# Patient Record
Sex: MALE | Race: AMERICAN INDIAN OR ALASKA NATIVE | HISPANIC OR LATINO
[De-identification: names, ages, dates, MRNs, and addresses within clinical notes are randomized per-mention and may not be internally consistent; named-entity substitution may affect disease eponyms.]

---

## 2023-05-08 PROBLEM — Z00.00 ENCOUNTER FOR PREVENTIVE HEALTH EXAMINATION: Status: ACTIVE | Noted: 2023-05-08

## 2023-05-10 VITALS — WEIGHT: 175 LBS | HEIGHT: 71 IN | BODY MASS INDEX: 24.5 KG/M2

## 2023-05-11 ENCOUNTER — APPOINTMENT (OUTPATIENT)
Dept: UROLOGY | Facility: CLINIC | Age: 68
End: 2023-05-11
Payer: MEDICARE

## 2023-05-11 DIAGNOSIS — Z87.891 PERSONAL HISTORY OF NICOTINE DEPENDENCE: ICD-10-CM

## 2023-05-11 DIAGNOSIS — Z80.3 FAMILY HISTORY OF MALIGNANT NEOPLASM OF BREAST: ICD-10-CM

## 2023-05-11 DIAGNOSIS — R97.20 ELEVATED PROSTATE, SPECIFIC ANTIGEN [PSA]: ICD-10-CM

## 2023-05-11 DIAGNOSIS — Z86.39 PERSONAL HISTORY OF OTHER ENDOCRINE, NUTRITIONAL AND METABOLIC DISEASE: ICD-10-CM

## 2023-05-11 DIAGNOSIS — Z78.9 OTHER SPECIFIED HEALTH STATUS: ICD-10-CM

## 2023-05-11 DIAGNOSIS — Z86.79 PERSONAL HISTORY OF OTHER DISEASES OF THE CIRCULATORY SYSTEM: ICD-10-CM

## 2023-05-11 PROCEDURE — 99443: CPT

## 2023-05-11 RX ORDER — LISINOPRIL 30 MG/1
TABLET ORAL
Refills: 0 | Status: ACTIVE | COMMUNITY

## 2023-05-11 RX ORDER — METFORMIN HYDROCHLORIDE 500 MG/1
500 TABLET, COATED ORAL
Refills: 0 | Status: ACTIVE | COMMUNITY

## 2023-05-11 RX ORDER — SITAGLIPTIN 100 MG/1
TABLET, FILM COATED ORAL
Refills: 0 | Status: ACTIVE | COMMUNITY

## 2023-05-11 RX ORDER — LOSARTAN POTASSIUM 50 MG/1
50 TABLET, FILM COATED ORAL
Refills: 0 | Status: ACTIVE | COMMUNITY

## 2023-05-11 RX ORDER — SIMVASTATIN 20 MG/1
20 TABLET, FILM COATED ORAL
Refills: 0 | Status: ACTIVE | COMMUNITY

## 2023-05-11 RX ORDER — CALCIUM CARB/VITAMIN D3/VIT K1 500-100-40
TABLET,CHEWABLE ORAL
Refills: 0 | Status: ACTIVE | COMMUNITY

## 2023-05-11 NOTE — HISTORY OF PRESENT ILLNESS
[Other Location: e.g. School (Enter Location, City,State)___] : at [unfilled], at the time of the visit. [FreeTextEntry1] : Dear Dr. Truman Bagley\par \par Thank you so much for the referral to help care for your patient. \par \par Chief Complaint: Elevated PSA\par Date of first visit: 5/11/2023 \par  ID # [id number]\par \par TOMY MORGAN is a 67 year old  man with PMHx HTN and DM who presents for elevated PSA. His PSA is 6.3 ng/ml. MRI on 4/20/23 demonstrated a PIRADS 4 lesion right apex. His PSA density is elevated (0.15 ng/ml/cc). He is biopsy naive and denies family hx of  malignancies. His select MDx resulted as 30% risk of detecting HG disease.\par \par 4/5/23 Select MDx 58% risk of detecting CaP on bx/30% risk of detecting HG disease\par \par Patient denies having urolift or BPH procedure done as indicated in referring provider's progress note. He has no urinary complaints. Denies weak flow, incomplete emptying, dysuria or hematuria.\par \par PSA Hx \par 6.30 ng/ml on 03/24/2023\par \par MRI Hx \par MRI at [facility] 04/20/2023. Volume 42 cc prostate with PIRADS 4 lesion to the posterior right prostate gland between mid and apex level measuring 0.5 X 0.3 x 0.4 cm. No LAD No EPE, No Bony Lesions. The clinical implications have been discussed with the patient. \par \par 05/11/2023 \par IPSS 2 QOL 4 -- he states he didn't mean to put 4. he has no complaints\par IIEF 15\par \par Prostate cancer screening: The patient and I spoke at length about prostate cancer screening, its risks and its benefits. The patient has 2 (age, PSA) risk factors for prostate cancer. He understands that many men with prostate cancer will die with the disease rather than of it and we also discussed the results large multi-center American and  prostate cancer screening trials. He also understands that PSA in and of itself does not diagnose prostate cancer but only assesses risk to a certain degree. The patient understands that to definitively screen for prostate cancer, a biopsy is required, and this procedure has risks, including bleeding, infection, ED and urinary retention. The patient opted to fusion biopsy. \par \par The patient denies fevers, chills, nausea and or vomiting and no unexplained weight loss. \par \par All pertinent laboratory, films and physician notes were reviewed. Questionnaire results were discussed with patient.\par

## 2023-05-11 NOTE — REASON FOR VISIT
[Initial Visit ___] : [unfilled] is here today for an initial visit  for [unfilled] [This encounter was initiated by telehealth (audio with video) and converted to telephone (audio only) due to technical difficulties.] : This encounter was initiated by telehealth (audio with video) and converted to telephone (audio only) due to technical difficulties. [Home] : at home, [unfilled] , at the time of the visit. [Medical Office: ([facility])___] : at the medical office located in  [Patient] : the patient

## 2023-05-11 NOTE — ASSESSMENT
[FreeTextEntry1] : 66 yo male with elevated PSA 6.3 ng/ml, MRI with PIRADS 4 lesion right apex, elevated PSAD 0.15 ng/ml/cc, biopsy naive, neg FH. Elevated select MDx (30% risk of HG disease).\par \par 1. Book for biopsy with Dr Solares\par 2. Images in [facility]stream\par \par He understands that many men with prostate cancer will die with the disease rather than of it and we also discussed the results large multi-center American and  prostate cancer screening trials. He also understands that PSA in and of itself does not diagnose prostate cancer but only assesses risk to a certain degree. The patient understands that to definitively screen for prostate cancer, a biopsy is required and this procedure has risks, including bleeding, infection, ED and urinary retention. The patient opted to move forward with the biopsy.\par \par The patient is aware to expect hematuria x 2 weeks and upto 4 weeks of hematospermia.  There is a risk of infection albeit much lower than a transrectal approach. In some cases patients can experience erectile dysfunction but this is usually self limiting.  Any fever/chills after the biopsy the patient is to contact the office and go to the ER for an immediate evaluation. He has been given paper instructions outlining these items - which includes medications to avoid prior to surgery.\par \par 1. CBC, BMP, PSA, UA UCx. EKG\par 2. Medical Clearance\par 3. TP biopsy at [facility]\par 4. follow up 2 weeks after biopsy with his primary urologist or ourselves.\par 5. we will call with the path results once they are resulted.\par \par Follow up with Dr Solares in office for ARNULFO/MRI review\par Biopsy with Dr Solares\par Postop follow up with Dr Solares\par \par \par \par Susan Aviles, NILA, was in the office during the entirety of this telemedicine visit.

## 2023-05-20 ENCOUNTER — APPOINTMENT (OUTPATIENT)
Dept: UROLOGY | Facility: CLINIC | Age: 68
End: 2023-05-20

## 2023-05-26 ENCOUNTER — OUTPATIENT (OUTPATIENT)
Dept: OUTPATIENT SERVICES | Facility: HOSPITAL | Age: 68
LOS: 1 days | End: 2023-05-26
Payer: SELF-PAY

## 2023-05-26 DIAGNOSIS — R97.20 ELEVATED PROSTATE SPECIFIC ANTIGEN [PSA]: ICD-10-CM

## 2023-05-26 PROCEDURE — 76377 3D RENDER W/INTRP POSTPROCES: CPT | Mod: 26

## 2023-05-26 PROCEDURE — C8001: CPT

## 2023-06-22 ENCOUNTER — APPOINTMENT (OUTPATIENT)
Dept: UROLOGY | Facility: CLINIC | Age: 68
End: 2023-06-22
Payer: MEDICARE

## 2023-06-22 ENCOUNTER — APPOINTMENT (OUTPATIENT)
Dept: UROLOGY | Facility: CLINIC | Age: 68
End: 2023-06-22

## 2023-06-22 VITALS
SYSTOLIC BLOOD PRESSURE: 128 MMHG | HEART RATE: 77 BPM | TEMPERATURE: 97.3 F | DIASTOLIC BLOOD PRESSURE: 82 MMHG | OXYGEN SATURATION: 96 %

## 2023-06-22 PROCEDURE — 99214 OFFICE O/P EST MOD 30 MIN: CPT

## 2023-06-26 LAB — BACTERIA UR CULT: NORMAL

## 2023-07-11 NOTE — ASU PATIENT PROFILE, ADULT - NS PREOP UNDERSTANDS INFO
No solid food/dairy/candy/gum after 10:45pm tonight, water allowed before 05:30am tomorrow; patient reminded to come with photo ID/insurance/credit card; dress in comfortable clothes; no jewelries/valuables/contacts, no smoking/drinking alcohol/recreational drug use today, escort to have a photo, address and callback number was given/yes

## 2023-07-11 NOTE — ASU PATIENT PROFILE, ADULT - NS PREOP MEDICATION GIVEN
05/25/17 1445   Discharge Assessment   Assessment Type Discharge Planning Assessment   Confirmed/corrected address and phone number on facesheet? Yes   Assessment information obtained from? Patient;Other  (daughter)   Expected Length of Stay (days) 3   Communicated expected length of stay with patient/caregiver yes   Prior to hospitilization cognitive status: Alert/Oriented   Prior to hospitalization functional status: Independent;Assistive Equipment   Current cognitive status: Alert/Oriented   Current Functional Status: Assistive Equipment;Needs Assistance   Arrived From admitted as an inpatient   Lives With alone   Able to Return to Prior Arrangements unable to determine at this time (comments)   Is patient able to care for self after discharge? Unable to determine at this time (comments)   How many people do you have in your home that can help with your care after discharge? 1   Who are your caregiver(s) and their phone number(s)? (Samson Canas, daughter, [phone])   Patient's perception of discharge disposition admitted as an inpatient   Readmission Within The Last 30 Days current reason for admission unrelated to previous admission   Patient currently being followed by outpatient case management? No   Patient currently receives home health services? Yes  ([facility]/p [facility] last hospitalization)   Patient previously received home health services and would like to resume services if necessary? Yes   If yes, name of home health provider: ([facility] for PT of R shoulder)   Does the patient currently use HME? Yes   Patient currently receives private duty nursing? N/A   Patient currently receives any other outside agency services? No   Equipment Currently Used at Home cane, straight;rollator   Do you have any problems affording any of your prescribed medications? No   Is the patient taking medications as prescribed? yes   Do you have any financial concerns preventing you from  receiving the healthcare you need? No   Does the patient have transportation to healthcare appointments? Yes   Transportation Available family or friend will provide   On Dialysis? No   Does the patient receive services at the [facility]mad[facility] Clinic? No   Are there any open cases? No   Discharge Plan A Home;Home Health   Discharge Plan B Skilled Nursing Facility   Patient/Family In Agreement With Plan yes     Javan Castillo MD  [address] [place] [address] [address] / [place] [id number]      Nargis COBOS (prev. TLCRx) - JONATHON Perez - [address] [facility]  [address] [facility]  [address]  [place] [address]  Phone: [phone] Fax: [phone]      Payor: [employer] MEDICARE / Plan: HU[employer] MEDICARE HMO / Product Type: Capitation /      yes

## 2023-07-12 ENCOUNTER — TRANSCRIPTION ENCOUNTER (OUTPATIENT)
Age: 68
End: 2023-07-12

## 2023-07-12 ENCOUNTER — APPOINTMENT (OUTPATIENT)
Dept: UROLOGY | Facility: AMBULATORY SURGERY CENTER | Age: 68
End: 2023-07-12

## 2023-07-12 ENCOUNTER — RESULT REVIEW (OUTPATIENT)
Age: 68
End: 2023-07-12

## 2023-07-12 ENCOUNTER — OUTPATIENT (OUTPATIENT)
Dept: OUTPATIENT SERVICES | Facility: HOSPITAL | Age: 68
LOS: 1 days | Discharge: HOME | End: 2023-07-12
Payer: MEDICARE

## 2023-07-12 VITALS
DIASTOLIC BLOOD PRESSURE: 59 MMHG | HEART RATE: 65 BPM | OXYGEN SATURATION: 98 % | RESPIRATION RATE: 16 BRPM | HEIGHT: 71 IN | TEMPERATURE: 97 F | WEIGHT: 171.96 LBS | SYSTOLIC BLOOD PRESSURE: 102 MMHG

## 2023-07-12 VITALS
DIASTOLIC BLOOD PRESSURE: 77 MMHG | RESPIRATION RATE: 16 BRPM | SYSTOLIC BLOOD PRESSURE: 155 MMHG | HEART RATE: 69 BPM | TEMPERATURE: 97 F | OXYGEN SATURATION: 98 %

## 2023-07-12 DIAGNOSIS — Z98.890 OTHER SPECIFIED POSTPROCEDURAL STATES: Chronic | ICD-10-CM

## 2023-07-12 LAB
GLUCOSE BLDC GLUCOMTR-MCNC: 166 MG/DL — HIGH (ref 70–99)
GLUCOSE BLDC GLUCOMTR-MCNC: 211 MG/DL — HIGH (ref 70–99)

## 2023-07-12 PROCEDURE — 76377 3D RENDER W/INTRP POSTPROCES: CPT | Mod: 26

## 2023-07-12 PROCEDURE — 55706 BX PRST8 NDL SAT SAMPLING: CPT

## 2023-07-12 PROCEDURE — G0416: CPT | Mod: 26

## 2023-07-12 RX ORDER — SIMVASTATIN 20 MG/1
1 TABLET, FILM COATED ORAL
Refills: 0 | DISCHARGE

## 2023-07-12 RX ORDER — TAMSULOSIN HYDROCHLORIDE 0.4 MG/1
1 CAPSULE ORAL
Refills: 0 | DISCHARGE

## 2023-07-12 RX ORDER — GABAPENTIN 400 MG/1
0 CAPSULE ORAL
Refills: 0 | DISCHARGE

## 2023-07-12 RX ORDER — AMLODIPINE BESYLATE 2.5 MG/1
1 TABLET ORAL
Refills: 0 | DISCHARGE

## 2023-07-12 RX ORDER — LOSARTAN POTASSIUM 100 MG/1
1 TABLET, FILM COATED ORAL
Refills: 0 | DISCHARGE

## 2023-07-12 RX ORDER — ONDANSETRON 8 MG/1
4 TABLET, FILM COATED ORAL ONCE
Refills: 0 | Status: DISCONTINUED | OUTPATIENT
Start: 2023-07-12 | End: 2023-07-12

## 2023-07-12 RX ORDER — SODIUM CHLORIDE 9 MG/ML
500 INJECTION, SOLUTION INTRAVENOUS
Refills: 0 | Status: DISCONTINUED | OUTPATIENT
Start: 2023-07-12 | End: 2023-07-12

## 2023-07-12 RX ORDER — BRIMONIDINE TARTRATE, TIMOLOL MALEATE 2; 5 MG/ML; MG/ML
1 SOLUTION/ DROPS OPHTHALMIC
Refills: 0 | DISCHARGE

## 2023-07-12 RX ORDER — ASPIRIN/CALCIUM CARB/MAGNESIUM 324 MG
1 TABLET ORAL
Refills: 0 | DISCHARGE

## 2023-07-12 RX ORDER — FENTANYL CITRATE 50 UG/ML
25 INJECTION INTRAVENOUS
Refills: 0 | Status: DISCONTINUED | OUTPATIENT
Start: 2023-07-12 | End: 2023-07-12

## 2023-07-12 RX ORDER — INSULIN ASPART 100 [IU]/ML
0 INJECTION, SOLUTION SUBCUTANEOUS
Refills: 0 | DISCHARGE

## 2023-07-12 RX ADMIN — SODIUM CHLORIDE 100 MILLILITER(S): 9 INJECTION, SOLUTION INTRAVENOUS at 10:57

## 2023-07-12 NOTE — ASU DISCHARGE PLAN (ADULT/PEDIATRIC) - CARE PROVIDER_API CALL
Sabrina Solares   Urology  [address], [place] [address] [place] [address]  Phone: [phone]  Fax: [phone]  Follow Up Time:

## 2023-07-12 NOTE — PRE-ANESTHESIA EVALUATION ADULT - NSANTHPEFT_GEN_ALL_CORE
Abdomen , obese soft, nontender, nondistended , no guarding or rigidity , no masses palpable , normal bowel sounds , Liver and Spleen,  no hepatosplenomegaly , liver nontender
lungs are clear  cor- s1s2 no murmurs

## 2023-07-13 ENCOUNTER — NON-APPOINTMENT (OUTPATIENT)
Age: 68
End: 2023-07-13

## 2023-07-13 PROBLEM — I10 ESSENTIAL (PRIMARY) HYPERTENSION: Chronic | Status: ACTIVE | Noted: 2023-07-12

## 2023-07-13 PROBLEM — E78.00 PURE HYPERCHOLESTEROLEMIA, UNSPECIFIED: Chronic | Status: ACTIVE | Noted: 2023-07-12

## 2023-07-13 PROBLEM — E11.9 TYPE 2 DIABETES MELLITUS WITHOUT COMPLICATIONS: Chronic | Status: ACTIVE | Noted: 2023-07-12

## 2023-07-13 PROBLEM — I70.209 UNSPECIFIED ATHEROSCLEROSIS OF NATIVE ARTERIES OF EXTREMITIES, UNSPECIFIED EXTREMITY: Chronic | Status: ACTIVE | Noted: 2023-07-12

## 2023-07-13 PROBLEM — D64.9 ANEMIA, UNSPECIFIED: Chronic | Status: ACTIVE | Noted: 2023-07-12

## 2023-07-18 LAB — SURGICAL PATHOLOGY STUDY: SIGNIFICANT CHANGE UP

## 2023-07-18 NOTE — ADDENDUM
[FreeTextEntry1] : 6/22/2023: Urine culture negative\par \par 7/12/2023: Prostate biopsy\par Final Diagnosis\par \par 1. Prostate, left anterior apex, needle biopsy\par -Benign prostate tissue.\par \par 2. Prostate, left anterior base, needle biopsy\par -Benign fibromuscular tissue.\par \par 3. Prostate, right anterior apex, needle biopsy\par -Benign prostate tissue.\par \par 4. Prostate, right anterior base, needle biopsy\par -Adenocarcinoma of the prostate, Prognostic Grade Group 1 ([place]\par score 3+3=6) involving 10% (1 mm in length) of 1 core.\par \par 5. Prostate, midline apex, needle biopsy\par -Benign prostate tissue.\par \par 6. Prostate, midline base, needle biopsy\par -Benign prostate tissue.\par \par 7. Prostate, left posterior apex, needle biopsy\par -Adenocarcinoma of the prostate, Prognostic Grade Group 1 (Michael\par score 3+3=6) involving 5% (<1 mm in length) of 1 core.\par \par 8. Prostate, left posterior base, needle biopsy\par -Benign prostate tissue.\par \par 9. Prostate, right posterior apex, needle biopsy\par -High grade prostatic intraepithelial neoplasia (HGPIN).\par \par 10. Prostate, right posterior base, needle biopsy\par -Benign prostate tissue.\par \par 11. Prostate, left lateral, needle biopsy\par -High grade prostatic intraepithelial neoplasia (HGPIN). Small focus\par of mildly atypical glands also noted.\par \par 12. Prostate, right lateral, needle biopsy\par -Benign prostate tissue.\par \par 13. Prostate, right PZPA, needle biopsy\par -Adenocarcinoma of the prostate, Prognostic Grade Group 1 ([place]\par score 3+3=6) involving 40% (4 mm in length) of 1 of 6 cores. Perineural\par invasion is identified. Separate prostate tissue with small focus of\par atypical glands, suspicious for carcinoma.

## 2023-07-18 NOTE — HISTORY OF PRESENT ILLNESS
[FreeTextEntry1] : Language: English/Vincentian\par Date of First visit: May 2023\par Accompanied by: Self\par Contact info: ***\par Referring Provider/PCP: Dr. Bagley\par \par \par \par CC/ Problem List:\par \par ===============================================================================\par FIRST VISIT:\par The patient was a 67 year male who first presented on  05/11/2023 for elevated PSA. He was supposed to  come in in May 2023 but he went to [place] instead.\par \par TOMY MORGAN is a 67 year old  man with PMHx HTN and DM who presents for elevated PSA. His PSA is 6.3 ng/ml. MRI on 4/20/23 demonstrated a PIRADS 4 lesion right apex. His PSA density is elevated (0.15 ng/ml/cc).  His select MDx resulted as 30% risk of detecting HG disease.\par \par 4/5/23 Select MDx 58% risk of detecting CaP on bx/30% risk of detecting HG disease\par \par The patient has no h/o Urolift or BPH procedure done as indicated in referring provider's progress note. He denies weak flow, incomplete emptying, dysuria or hematuria. He states that he has some frequency, occasional dysuria, and sometimes a weak stream. He denies FH of prostate issues. He has never had a prostate biopsy\par \par 05/11/2023 \par IPSS 2 QOL 4 -- he states he didn't mean to put 4. he has no complaints\par IIEF 15\par \par -------------------------------------------------------------------------------------------\par INTERVAL VISITS:\par \par \par ===============================================================================\par \par PMH:  DM, HTN\par PSH: Denies\par POBH: (if applicable)\par FH: \par \par ALL: Denies\par MEDS: DM meds, no HTN, ASA 81mg, no blood thinners\par SOC: He quit smoking around age 32yo, He used to drink but stopped in his early 60's, no Drugs\par \par \par ROS: Review of Systems is as per HPI unless otherwise denoted below\par \par \par ===============================================================================\par DATA: \par \par LABS:-------------------------------------------------------------------------------------------------------------------\par PSA Hx \par 6.30 ng/ml on 03/24/2023\par \par \par RADS:-------------------------------------------------------------------------------------------------------------------\par MRI Hx \par MRI at R 04/20/2023. Volume 42 cc prostate with PIRADS 4 lesion to the posterior right prostate gland between mid and apex level measuring 0.5 X 0.3 x 0.4 cm. No LAD No EPE, No Bony Lesions. \par \par \par PATHOLOGY/CYTOLOGY:-------------------------------------------------------------------------------------------\par \par \par \par VOIDING STUDIES: ----------------------------------------------------------------------------------------------------\par \par \par \par STONE STUDIES: (Analysis/LLSA)----------------------------------------------------------------------------------\par \par \par \par PROCEDURES: -----------------------------------------------------------------------------------------------\par \par \par \par \par ===============================================================================\par \par PHYSICAL EXAM:\par \par GEN: AAOx3, NAD; Habitus: normal\par \par BARRIERS to CARE: some language\par \par PSYCH: Appropriate Behavior, Affect Congruent\par \par HEENT: AT/NC Trachea midline. EOMI.\par \par Lungs: No labored breathing.\par \par NEURO: + Movement, all 4 extremities grossly intact without deficits. No tremors.\par \par SKIN: Warm dry. No visible rashes or ulcers\par \par GAIT: Gait normal, Stability good\par \par \par  FOCUSED: -------------------------------------------------------------------------------------------------\par \par In compliance with [facility] policy the patient was offered a chaperone during this exam\par The patient DID accept a chaperone\par The person present for this entire exam/procedure as chaperone was: Torrey Torres MA\par \par Prostate: (date 6/22/2023) 60 grams. Smooth. No nodules. Symmetric. No tenderness, No Fluctuance.\par ARNULFO: No hemorrhoids. Normal tone.\par =======================================================================================\par \par \par \par \par ASSESSMENT and PLAN\par \par The patient is a 67 year male with a history of the following:\par \par 1. Elevated PSA, PIRADs 4 lesion, no FH prostate cancer\par \par Because of the patient's clinical situation we decided to set the patient up for a Transperineal Fusion prostate biopsy. This is done in the operating room under anesthesia.\par \par The patient understands that the risks of this procedure include bleeding (hematuria, hematospermia, blood in stool or per rectum), infection, difficulty voiding/inability to urinate, fever, and, with repeated biopsies, problems with erections.  He understands that if he can not void after the biopsy he will go home with a reaves. \par \par He was given and understands the biopsy prep:\par \par a. Fleet's enema on the morning of your biopsy.\par \par b. Avoid blood thinners, fish oil and supplemental Vitamin E. He can stay on baby ASA if he takes it.\par \par c. He was given information regarding blood thinners if he is on them. \par \par The above was explained in Ukrainian and the patient stated he understood the above and signed consent. \par \par \par -----------------------------------------------------------------------------------------------------\par LABS/TESTS Ordered: UCx\par Meds Ordered:\par Follow up: Biopsy\par -----------------------------------------------------------------------------------------------------\par \par The total amount of time I have personally spent preparing for this visit, reviewing the patient's test results, obtaining external history, ordering tests/medications, documenting clinical information, communicating with and counseling the patient/family and/or caregiver(s), and spent face to face with the patient explaining the above was  35 minutes.\par \par The patient has the following barriers to care / social determinants of health which made this visit substantially longer than normal:\par \par Thank you for allowing me to assist in the care of your patient. Should you have any questions please do not hesitate to reach out to me.\par \par \par Sabrina Solares MD\[facility] Associate \[facility] Department of Urology\[facility] Phone: [phone]\[facility] Fax: [phone]\[facility] \[facility] [address]\[facility] [address]\[facility]

## 2023-07-19 NOTE — ASU DISCHARGE PLAN (ADULT/PEDIATRIC) - DO NOT DRIVE IF TAKING PAIN MEDICATION
Routed to Marcos  Please review CT scan from 7/18 in chart  Pt is currently scheduled with you on 8/11    Repeat US from CT scan is scheduled for 8/29   NULL

## 2023-07-25 ENCOUNTER — APPOINTMENT (OUTPATIENT)
Dept: UROLOGY | Facility: CLINIC | Age: 68
End: 2023-07-25
Payer: MEDICARE

## 2023-07-25 VITALS
HEART RATE: 71 BPM | TEMPERATURE: 97.5 F | DIASTOLIC BLOOD PRESSURE: 64 MMHG | SYSTOLIC BLOOD PRESSURE: 103 MMHG | OXYGEN SATURATION: 97 %

## 2023-07-25 PROCEDURE — 99213 OFFICE O/P EST LOW 20 MIN: CPT

## 2023-08-19 NOTE — ADDENDUM
[FreeTextEntry1] : 7/12/2023: Prostate biopsy 1. Prostate, left anterior apex, needle biopsy -Benign prostate tissue.  2. Prostate, left anterior base, needle biopsy -Benign fibromuscular tissue.  3. Prostate, right anterior apex, needle biopsy -Benign prostate tissue.  4. Prostate, right anterior base, needle biopsy -Adenocarcinoma of the prostate, Prognostic Grade Group 1 (Michael score 3+3=6) involving 10% (1 mm in length) of 1 core.  5. Prostate, midline apex, needle biopsy -Benign prostate tissue.  6. Prostate, midline base, needle biopsy -Benign prostate tissue.  7. Prostate, left posterior apex, needle biopsy -Adenocarcinoma of the prostate, Prognostic Grade Group 1 ([place] score 3+3=6) involving 5% (<1 mm in length) of 1 core.  8. Prostate, left posterior base, needle biopsy -Benign prostate tissue.  9. Prostate, right posterior apex, needle biopsy -High grade prostatic intraepithelial neoplasia (HGPIN).  10. Prostate, right posterior base, needle biopsy -Benign prostate tissue.  11. Prostate, left lateral, needle biopsy -High grade prostatic intraepithelial neoplasia (HGPIN). Small focus of mildly atypical glands also noted.  12. Prostate, right lateral, needle biopsy -Benign prostate tissue.  13. Prostate, right PZPA, needle biopsy -Adenocarcinoma of the prostate, Prognostic Grade Group 1 ([place] score 3+3=6) involving 40% (4 mm in length) of 1 of 6 cores. Perineural invasion is identified. Separate prostate tissue with small focus of atypical glands, suspicious for carcinoma.

## 2023-08-19 NOTE — HISTORY OF PRESENT ILLNESS
[FreeTextEntry1] : Language: English/Telugu\par Date of First visit: May 2023\par Accompanied by: Self\par Contact info: ***\par Referring Provider/PCP: Dr. Bagley\par \par \par \par CC/ Problem List:\par \par ===============================================================================\par FIRST VISIT:\par The patient was a 67 year male who first presented on  05/11/2023 for elevated PSA. He was supposed to  come in in May 2023 but he went to [place] instead.\par \par TOMY MORGAN is a 67 year old  man with PMHx HTN and DM who presents for elevated PSA. His PSA is 6.3 ng/ml. MRI on 4/20/23 demonstrated a PIRADS 4 lesion right apex. His PSA density is elevated (0.15 ng/ml/cc).  His select MDx resulted as 30% risk of detecting HG disease.\par \par 4/5/23 Select MDx 58% risk of detecting CaP on bx/30% risk of detecting HG disease\par \par The patient has no h/o Urolift or BPH procedure done as indicated in referring provider's progress note. He denies weak flow, incomplete emptying, dysuria or hematuria. He states that he has some frequency, occasional dysuria, and sometimes a weak stream. He denies FH of prostate issues. He has never had a prostate biopsy\par \par 05/11/2023 \par IPSS 2 QOL 4 -- he states he didn't mean to put 4. he has no complaints\par IIEF 15\par \par -------------------------------------------------------------------------------------------\par INTERVAL VISITS:\par \par The patient's age today 07/[birth year]23 is 67 year old.\par Please note interval events and changes in PMH, PSH, MEDS and ALLERGIES were reviewed.\par His prostate biopsy showed 3 cores of GG1 prostate cancer\par He states that after the biopsy he had no hematuria, BRBPR, difficulty voiding, pain, or fever. He feels fine.\par \par ===============================================================================\par \par PMH:  DM, HTN, prostate cancer\par PSH: Denies\par POBH: (if applicable)\par FH: \par \par ALL: Denies\par MEDS: DM meds, no HTN, ASA 81mg, no blood thinners\par SOC: He quit smoking around age 32yo, He used to drink but stopped in his early 60's, no Drugs\par \par \par ROS: Review of Systems is as per HPI unless otherwise denoted below\par \par \par ===============================================================================\par DATA: \par \par LABS:-------------------------------------------------------------------------------------------------------------------\par PSA Hx \par 6.30 ng/ml on 03/24/2023\par \par \par RADS:-------------------------------------------------------------------------------------------------------------------\par MRI Hx \par MRI at [facility] 04/20/2023. Volume 42 cc prostate with PIRADS 4 lesion to the posterior right prostate gland between mid and apex level measuring 0.5 X 0.3 x 0.4 cm. No LAD No EPE, No Bony Lesions. \par \par \par PATHOLOGY/CYTOLOGY:-------------------------------------------------------------------------------------------\par 6/22/2023: Urine culture negative\par \par 7/12/2023: Prostate biopsy\par Final Diagnosis\par \par 1. Prostate, left anterior apex, needle biopsy\par -Benign prostate tissue.\par \par 2. Prostate, left anterior base, needle biopsy\par -Benign fibromuscular tissue.\par \par 3. Prostate, right anterior apex, needle biopsy\par -Benign prostate tissue.\par \par 4. Prostate, right anterior base, needle biopsy\par -Adenocarcinoma of the prostate, Prognostic Grade Group 1 (Burdick\par score 3+3=6) involving 10% (1 mm in length) of 1 core.\par \par 5. Prostate, midline apex, needle biopsy\par -Benign prostate tissue.\par \par 6. Prostate, midline base, needle biopsy\par -Benign prostate tissue.\par \par 7. Prostate, left posterior apex, needle biopsy\par -Adenocarcinoma of the prostate, Prognostic Grade Group 1 (Burdick\par score 3+3=6) involving 5% (<1 mm in length) of 1 core.\par \par 8. Prostate, left posterior base, needle biopsy\par -Benign prostate tissue.\par \par 9. Prostate, right posterior apex, needle biopsy\par -High grade prostatic intraepithelial neoplasia (HGPIN).\par \par 10. Prostate, right posterior base, needle biopsy\par -Benign prostate tissue.\par \par 11. Prostate, left lateral, needle biopsy\par -High grade prostatic intraepithelial neoplasia (HGPIN). Small focus\par of mildly atypical glands also noted.\par \par 12. Prostate, right lateral, needle biopsy\par -Benign prostate tissue.\par \par 13. Prostate, right PZPA, needle biopsy\par -Adenocarcinoma of the prostate, Prognostic Grade Group 1 (Michael\par score 3+3=6) involving 40% (4 mm in length) of 1 of 6 cores. Perineural\par invasion is identified. Separate prostate tissue with small focus of\par atypical glands, suspicious for carcinoma.\par \par \par VOIDING STUDIES: ----------------------------------------------------------------------------------------------------\par \par \par \par STONE STUDIES: (Analysis/LLSA)----------------------------------------------------------------------------------\par \par \par \par PROCEDURES: -----------------------------------------------------------------------------------------------\par \par \par \par \par ===============================================================================\par \par PHYSICAL EXAM:\par \par GEN: AAOx3, NAD; Habitus: normal\par \par BARRIERS to CARE: some language\par \par PSYCH: Appropriate Behavior, Affect Congruent\par \par HEENT: AT/NC Trachea midline. EOMI.\par \par Lungs: No labored breathing.\par \par NEURO: + Movement, all 4 extremities grossly intact without deficits. No tremors.\par \par SKIN: Warm dry. No visible rashes or ulcers\par \par GAIT: Gait normal, Stability good\par \par \par  FOCUSED: -------------------------------------------------------------------------------------------------\par \par In compliance with [facility] policy the patient was offered a chaperone during this exam\par The patient DID accept a chaperone\par The person present for this entire exam/procedure as chaperone was: Torrey Torres MA\par \par Prostate: (date 6/22/2023) 60 grams. Smooth. No nodules. Symmetric. No tenderness, No Fluctuance.\par ARNULFO: No hemorrhoids. Normal tone.\par =======================================================================================\par \par \par \par \par ASSESSMENT and PLAN\par \par The patient is a 67 year male with a history of the following:\par \par 1. Elevated PSA, PIRADs 4 lesion, no FH prostate cancer\par On Prostate biopsy he had three cores of GG1 prostate cancer. I explained these results to him and he knows he has to f/u with Dr. Bagley. I will also send his results to Dr. Bagley and ask them to call the patient. \par \par the patient understands the importance of f/u with Dr. Bagley. He promised me he would make an appt. \par \par -----------------------------------------------------------------------------------------------------\par LABS/TESTS Ordered: \par Meds Ordered:\par Follow up: with Dr. Bagley\par -----------------------------------------------------------------------------------------------------\par \par The total amount of time I have personally spent preparing for this visit, reviewing the patient's test results, obtaining external history, ordering tests/medications, documenting clinical information, communicating with and counseling the patient/family and/or caregiver(s), and spent face to face with the patient explaining the above was  25 minutes.\par \par \par Thank you for allowing me to assist in the care of your patient. Should you have any questions please do not hesitate to reach out to me.\par \par \par Sabrina Solares MD\par Associate \par Department of Urology\par [facility]\par Phone: [phone]\par Fax: [phone]\par \par [address]\par [facility] [id number]\par

## 2024-04-12 ENCOUNTER — APPOINTMENT (OUTPATIENT)
Dept: UROLOGY | Facility: CLINIC | Age: 69
End: 2024-04-12
